# Patient Record
Sex: MALE | Race: WHITE | NOT HISPANIC OR LATINO | Employment: UNEMPLOYED | URBAN - METROPOLITAN AREA
[De-identification: names, ages, dates, MRNs, and addresses within clinical notes are randomized per-mention and may not be internally consistent; named-entity substitution may affect disease eponyms.]

---

## 2020-06-14 ENCOUNTER — APPOINTMENT (OUTPATIENT)
Dept: RADIOLOGY | Facility: MEDICAL CENTER | Age: 25
End: 2020-06-14
Attending: EMERGENCY MEDICINE
Payer: COMMERCIAL

## 2020-06-14 ENCOUNTER — HOSPITAL ENCOUNTER (EMERGENCY)
Facility: MEDICAL CENTER | Age: 25
End: 2020-06-14
Attending: EMERGENCY MEDICINE
Payer: COMMERCIAL

## 2020-06-14 VITALS
DIASTOLIC BLOOD PRESSURE: 62 MMHG | HEART RATE: 66 BPM | WEIGHT: 155 LBS | BODY MASS INDEX: 20.54 KG/M2 | OXYGEN SATURATION: 99 % | TEMPERATURE: 98.1 F | HEIGHT: 73 IN | SYSTOLIC BLOOD PRESSURE: 119 MMHG | RESPIRATION RATE: 17 BRPM

## 2020-06-14 DIAGNOSIS — B34.9 VIRAL SYNDROME: ICD-10-CM

## 2020-06-14 LAB
CLARITY CSF: CLEAR
COLOR CSF: COLORLESS
COLOR SPUN CSF: COLORLESS
COVID ORDER STATUS COVID19: NORMAL
CSF COMMENTS 1658: NORMAL
GLUCOSE CSF-MCNC: 57 MG/DL (ref 40–80)
GRAM STN SPEC: NORMAL
LYMPHOCYTES NFR CSF: 67 %
MONONUC CELLS NFR CSF: 33 %
PROT CSF-MCNC: 29 MG/DL (ref 15–45)
RBC # CSF: <1 CELLS/UL
S PYO DNA SPEC NAA+PROBE: NOT DETECTED
SIGNIFICANT IND 70042: NORMAL
SITE SITE: NORMAL
SOURCE SOURCE: NORMAL
SPECIMEN VOL CSF: 4.5 ML
TUBE # CSF: 4
TUBE # CSF: 4
WBC # CSF: 1 CELLS/UL (ref 0–10)

## 2020-06-14 PROCEDURE — 99285 EMERGENCY DEPT VISIT HI MDM: CPT

## 2020-06-14 PROCEDURE — 71045 X-RAY EXAM CHEST 1 VIEW: CPT

## 2020-06-14 PROCEDURE — 62270 DX LMBR SPI PNXR: CPT

## 2020-06-14 PROCEDURE — C9803 HOPD COVID-19 SPEC COLLECT: HCPCS | Performed by: EMERGENCY MEDICINE

## 2020-06-14 PROCEDURE — 82945 GLUCOSE OTHER FLUID: CPT

## 2020-06-14 PROCEDURE — 87205 SMEAR GRAM STAIN: CPT

## 2020-06-14 PROCEDURE — 87651 STREP A DNA AMP PROBE: CPT

## 2020-06-14 PROCEDURE — 700111 HCHG RX REV CODE 636 W/ 250 OVERRIDE (IP): Performed by: EMERGENCY MEDICINE

## 2020-06-14 PROCEDURE — 96374 THER/PROPH/DIAG INJ IV PUSH: CPT

## 2020-06-14 PROCEDURE — 89051 BODY FLUID CELL COUNT: CPT

## 2020-06-14 PROCEDURE — 84157 ASSAY OF PROTEIN OTHER: CPT

## 2020-06-14 PROCEDURE — 87070 CULTURE OTHR SPECIMN AEROBIC: CPT

## 2020-06-14 RX ORDER — ONDANSETRON 2 MG/ML
4 INJECTION INTRAMUSCULAR; INTRAVENOUS ONCE
Status: COMPLETED | OUTPATIENT
Start: 2020-06-14 | End: 2020-06-14

## 2020-06-14 RX ORDER — ONDANSETRON 4 MG/1
4 TABLET, ORALLY DISINTEGRATING ORAL EVERY 8 HOURS PRN
Qty: 10 TAB | Refills: 0 | Status: SHIPPED | OUTPATIENT
Start: 2020-06-14 | End: 2024-01-23

## 2020-06-14 RX ADMIN — ONDANSETRON 4 MG: 2 INJECTION INTRAMUSCULAR; INTRAVENOUS at 13:39

## 2020-06-14 NOTE — ED PROVIDER NOTES
ED Provider Note    Scribed for Iggy Cisse M.D. by Harpal Sharma. 6/14/2020, 1:19 PM.    Primary care provider: No primary care provider noted.  Means of arrival: walk-in  History obtained from: patient  History limited by: none    CHIEF COMPLAINT  Chief Complaint   Patient presents with   • Headache     Walk in from home for headache and chills   • Chills       HPI  Jay Jay Jarrell is a 25 y.o. male who presents to the Emergency Department with complaints of a headache and fever for the past 3 days. He reports associated sore throat and vomiting that began today. He denies any cough, diarrhea, rash, photophobia, shortness of breath, chest pain, or abdominal pain. No alleviating or exacerbating factors noted. He states he may have been exposed to someone with a cough, but he has not had any exposure to known COVID-19 patients.     PPE Note: I personally donned full PPE for all patient encounters during this visit, including being clean-shaven with an N95 respirator mask, gloves, and eye protection. Scribe remained outside the patient's room and did not have any contact with the patient for the duration of patient encounter.       REVIEW OF SYSTEMS  Pertinent positives include headache, sore throat, vomiting. Pertinent negatives include no cough, diarrhea, rash, photophobia, shortness of breath, chest pain, or abdominal pain.  All other systems reviewed and negative.    PAST MEDICAL HISTORY  None noted when reviewed.     SURGICAL HISTORY  patient denies any surgical history    SOCIAL HISTORY  Social History     Tobacco Use   • Smoking status: None noted when reviewed.    Substance Use Topics   • Alcohol use: None noted when reviewed.    • Drug use: None noted when reviewed.         FAMILY HISTORY  History reviewed. No pertinent family history.    CURRENT MEDICATIONS  No current outpatient medications     ALLERGIES  No Known Allergies    PHYSICAL EXAM  VITAL SIGNS: /73   Pulse 69   Temp 36.7 °C (98.1 °F)  "(Oral)   Resp 18   Ht 1.854 m (6' 1\")   Wt 70.3 kg (155 lb)   SpO2 97%   BMI 20.45 kg/m²     Constitutional: Well developed, Well nourished, No acute distress, Non-toxic appearance.   HENT: Normocephalic, Atraumatic, TMs normal, mucous membranes moist, mild pharyngeal erythema.  Eyes: nonicteric  Neck: Supple, no neck stiffness.  Lymphatic: No lymphadenopathy noted.   Cardiovascular: Regular rate and rhythm, no gallops rubs or murmurs  Lungs: Clear bilaterally   Abdomen: Bowel sounds normal, Soft, No tenderness  Skin: Warm, Dry, no rash  Back: No tenderness, No CVA tenderness.   Genitalia: Deferred  Rectal: Deferred  Extremities: No edema  Neurologic: Alert, appropriate, follows commands, moving all extremities, normal speech   Psychiatric: Affect normal    DIAGNOSTIC STUDIES / PROCEDURES    LABS  Results for orders placed or performed during the hospital encounter of 06/14/20   COVID/SARS CoV-2 PCR    Specimen: Nasopharyngeal; Respirate   Result Value Ref Range    COVID Order Status Received    Group A Strep by PCR   Result Value Ref Range    Group A Strep by PCR Not Detected Not Detected   2019-nCoV RNA ([facility])   Result Value Ref Range    2019-nCoV Source NP Swab       All labs reviewed by me.     RADIOLOGY  DX-CHEST-PORTABLE (1 VIEW)   Final Result      No acute cardiopulmonary process is seen.        The radiologist's interpretation of all radiological studies have been reviewed by me.    Lumbar Puncture Procedure Note    Indication: Suspected meningitis and to obtain spinal fluid for diagnostic testing    Consent: The patient was counseled regarding the procedure, it's indications, risks, potential complications and alternatives and any questions were answered. Consent was obtained.    Procedure: The patient was placed in the sitting position and the appropriate landmarks were identified. The area was prepped and draped in the usual sterile fashion. Anesthesia was obtained using 1 cc of 1% Lidocaine " without epinephrine. A spinal needle was inserted at the L4- L5 level with the stylet in place until spinal fluid was returned. Opening pressure was not measured. At this point 4.0 cc of clear cerebral spinal fluid was obtained and sent for appropriate testing. The stylet was then replaced and the needle was withdrawn. A sterile dressing was placed over the site and the patient was placed in the supine position.    The patient tolerated the procedure well.    Complications: None      COURSE & MEDICAL DECISION MAKING  Nursing notes, VS, PMSFHx reviewed in chart.     1:19 PM Patient seen and examined at bedside. Ordered for strep testing, COVID testing, and CXR to evaluate. Patient was treated with Zofran 4 mg for his symptoms.     2:14 PM - Patient's strep test and CXR returned as negative. Reevaluated patient at bedside and discussed his results. Based on the patient's clinical presentation, I advised completing a lumbar puncture to further evaluate. Patient verbalizes understanding and consent.     2:33 PM - Lumbar puncture was completed as noted above. Patient tolerated the procedure well.     Decision Making:  This is a 25 y.o. year old male who presents with headache and fever with a temperature of 100.4 in triage.  The patient after questioning did note a slight sore throat.  He was swabbed for strep which was negative.  Chest x-ray was clear.  The patient did not note any sinus pain or pressure.  He was consented for spinal tap to exclude meningitis.  This was performed in his results look normal.  He was also swabbed for COVID and that is currently pending.  He is going to self quarantine at home until that is back.    DISPOSITION:  Patient will be discharged home in stable condition.    FOLLOW UP:  [address] [address] [phone]  Schedule an appointment as soon as possible for a visit today        OUTPATIENT MEDICATIONS:  New Prescriptions     ONDANSETRON (ZOFRAN ODT) 4 MG TABLET DISPERSIBLE    Take 1 Tab by mouth every 8 hours as needed for Nausea.        FINAL IMPRESSION  1. Viral syndrome          Harpal OLGUIN (Scribe), am scribing for, and in the presence of, Iggy Cisse M.D..    Electronically signed by: Harpal Sharma (Scribe), 6/14/2020    IIggy M.D. personally performed the services described in this documentation, as scribed by Harpal Sharma in my presence, and it is both accurate and complete.    The note accurately reflects work and decisions made by me.  Iggy Cisse M.D.  6/14/2020  4:03 PM

## 2020-06-14 NOTE — ED TRIAGE NOTES
"Chief Complaint   Patient presents with   • Headache     Walk in from home for headache and chills   • Chills     /73   Pulse 69   Temp 36.7 °C (98.1 °F) (Oral)   Resp 18   Ht 1.854 m (6' 1\")   Wt 70.3 kg (155 lb)   SpO2 97%   BMI 20.45 kg/m²     "

## 2020-06-14 NOTE — DISCHARGE INSTRUCTIONS
A test has been sent for COVID-19.  Given your symptoms, you could have this.  You will be called by the health department with results.  Until that time you need to self quarantine.  Return for trouble breathing persistent vomiting or other concerns.

## 2020-06-17 LAB
BACTERIA CSF CULT: NORMAL
GRAM STN SPEC: NORMAL
SIGNIFICANT IND 70042: NORMAL
SITE SITE: NORMAL
SOURCE SOURCE: NORMAL

## 2020-06-19 LAB
SARS-COV-2 RNA RESP QL NAA+PROBE: NOT DETECTED
SPECIMEN SOURCE: NORMAL

## 2020-06-20 NOTE — ED NOTES
COVID-19 Test Follow Up  06/20/20      Patient tested negative for COVID-19. I have informed the patient of the result by phone. Encouraged to stay at home until no fever for 72 hours without the use of fever reducing medications and symptoms improving. Informed there is no need to further self-isolate for 14 days for COVID-19 unless otherwise directed by the Health Dept.     They are advised to return to the ER for worsening symptoms including difficulty breathing, ongoing fever, weakness or chest pain.    Muriel Trevino, PharmD

## 2024-01-23 ENCOUNTER — OFFICE VISIT (OUTPATIENT)
Dept: MEDICAL GROUP | Facility: MEDICAL CENTER | Age: 29
End: 2024-01-23
Payer: COMMERCIAL

## 2024-01-23 VITALS
RESPIRATION RATE: 16 BRPM | HEIGHT: 72 IN | HEART RATE: 74 BPM | TEMPERATURE: 97.3 F | DIASTOLIC BLOOD PRESSURE: 70 MMHG | WEIGHT: 172.4 LBS | SYSTOLIC BLOOD PRESSURE: 110 MMHG | BODY MASS INDEX: 23.35 KG/M2 | OXYGEN SATURATION: 96 %

## 2024-01-23 DIAGNOSIS — M25.512 CHRONIC PAIN OF BOTH SHOULDERS: ICD-10-CM

## 2024-01-23 DIAGNOSIS — R10.32 LEFT LOWER QUADRANT ABDOMINAL PAIN: ICD-10-CM

## 2024-01-23 DIAGNOSIS — M25.511 CHRONIC PAIN OF BOTH SHOULDERS: ICD-10-CM

## 2024-01-23 DIAGNOSIS — Z23 NEED FOR HPV VACCINATION: ICD-10-CM

## 2024-01-23 DIAGNOSIS — Z00.00 PREVENTATIVE HEALTH CARE: ICD-10-CM

## 2024-01-23 DIAGNOSIS — G89.29 CHRONIC PAIN OF BOTH SHOULDERS: ICD-10-CM

## 2024-01-23 PROBLEM — R10.13 DYSPEPSIA: Status: ACTIVE | Noted: 2024-01-23

## 2024-01-23 PROCEDURE — 90651 9VHPV VACCINE 2/3 DOSE IM: CPT | Performed by: PHYSICIAN ASSISTANT

## 2024-01-23 PROCEDURE — 3078F DIAST BP <80 MM HG: CPT | Performed by: PHYSICIAN ASSISTANT

## 2024-01-23 PROCEDURE — 99204 OFFICE O/P NEW MOD 45 MIN: CPT | Mod: 25 | Performed by: PHYSICIAN ASSISTANT

## 2024-01-23 PROCEDURE — 3074F SYST BP LT 130 MM HG: CPT | Performed by: PHYSICIAN ASSISTANT

## 2024-01-23 PROCEDURE — 90471 IMMUNIZATION ADMIN: CPT | Performed by: PHYSICIAN ASSISTANT

## 2024-01-23 ASSESSMENT — PATIENT HEALTH QUESTIONNAIRE - PHQ9: CLINICAL INTERPRETATION OF PHQ2 SCORE: 0

## 2024-01-23 NOTE — PROGRESS NOTES
Subjective:   Jay Jay Jarrell is a 28 y.o. male here today for intermittent, left lower quad abdominal pain and bilateral shoulder pain.  Also to establish care.    Left lower quadrant abdominal pain  This is a pleasant 28-year-old male here today to establish care.  Has had a couple episodes where he had abdominal pain with other associated symptoms.  Once was after a concert.  Only ate [employer].  Most recently it was after eating try tip which was in his freezer.  He will have some intermittent left lower quadrant abdominal pain.  Some issues with his bowel movements.  Denies any fevers.  No nausea or vomiting.  No ivelisse reflux symptoms.  Denies any rectal bleeding.    Chronic pain of both shoulders  Also has injured both of his shoulders.  Did have x-rays performed last year that were negative.  Still has some issues with range of motion.  There is a occasional popping sound when his shoulders are rotating.       Current medicines (including changes today)  No current outpatient medications on file.     No current facility-administered medications for this visit.     He  has no past medical history on file.    Social History and Family History were reviewed and updated.    ROS   No chest pain, no shortness of breath, no abdominal pain and all other systems were reviewed and are negative.       Objective:     /70 (BP Location: Left arm, Patient Position: Sitting, BP Cuff Size: Adult)   Pulse 74   Temp 36.3 °C (97.3 °F) (Temporal)   Resp 16   Ht 1.829 m (6')   Wt 78.2 kg (172 lb 6.4 oz)   SpO2 96%  Body mass index is 23.38 kg/m².   Physical Exam:  Constitutional: Alert, no distress.  Skin: Warm, dry, good turgor, no rashes in visible areas.  Eye: Equal, round and reactive, conjunctiva clear, lids normal.  ENMT: Lips without lesions, good dentition, oropharynx clear.  Neck: Trachea midline, no masses.   Psych: Alert and oriented x3, normal affect and mood.        Assessment and Plan:   The following  treatment plan was discussed    1. Left lower quadrant abdominal pain  Chronic condition.  Has had couple location of several weeks of symptoms.  Symptoms could be secondary to IBS.  Advised to take a dietary fiber supplement.  If symptoms continue he may contact me through [facility] for referral to see GI.  This point we will continue to watch to see what may trigger his symptoms.    2. Chronic pain of both shoulders  Chronic condition.  Stable.  Referred to orthopedics to establish care.  Reviewed x-rays already in the system.  Discussed likely he will need PT.  If he does need PT to contact me for referral to [facility].  - Referral to Orthopedics    3. Preventative health care  Ordered labs.  Fast 8 hours.  He will be contacted with the results.  - CBC WITH DIFFERENTIAL; Future  - Comp Metabolic Panel; Future  - Lipid Profile; Future  - HEMOGLOBIN A1C; Future  - TSH WITH REFLEX TO FT4; Future    4. Need for HPV vaccination  HPV #3 administered.  No complaints.  - Gardasil 9         Followup: Return in about 1 year (around 1/23/2025), or if symptoms worsen or fail to improve.    Please note that this dictation was created using voice recognition software. I have made every reasonable attempt to correct obvious errors, but I expect that there are errors of grammar and possibly content that I did not discover before finalizing the note.

## 2024-01-23 NOTE — ASSESSMENT & PLAN NOTE
Also has injured both of his shoulders.  Did have x-rays performed last year that were negative.  Still has some issues with range of motion.  There is a occasional popping sound when his shoulders are rotating.

## 2024-01-23 NOTE — ASSESSMENT & PLAN NOTE
This is a pleasant 28-year-old male here today to establish care.  Has had a couple episodes where he had abdominal pain with other associated symptoms.  Once was after a concert.  Only ate [employer].  Most recently it was after eating try tip which was in his freezer.  He will have some intermittent left lower quadrant abdominal pain.  Some issues with his bowel movements.  Denies any fevers.  No nausea or vomiting.  No ivelisse reflux symptoms.  Denies any rectal bleeding.

## 2024-01-30 ENCOUNTER — HOSPITAL ENCOUNTER (OUTPATIENT)
Dept: LAB | Facility: MEDICAL CENTER | Age: 29
End: 2024-01-30
Attending: PHYSICIAN ASSISTANT
Payer: COMMERCIAL

## 2024-01-30 DIAGNOSIS — Z00.00 PREVENTATIVE HEALTH CARE: ICD-10-CM

## 2024-01-30 LAB
ALBUMIN SERPL BCP-MCNC: 4.7 G/DL (ref 3.2–4.9)
ALBUMIN/GLOB SERPL: 1.7 G/DL
ALP SERPL-CCNC: 61 U/L (ref 30–99)
ALT SERPL-CCNC: 19 U/L (ref 2–50)
ANION GAP SERPL CALC-SCNC: 10 MMOL/L (ref 7–16)
AST SERPL-CCNC: 19 U/L (ref 12–45)
BASOPHILS # BLD AUTO: 0.6 % (ref 0–1.8)
BASOPHILS # BLD: 0.03 K/UL (ref 0–0.12)
BILIRUB SERPL-MCNC: 0.8 MG/DL (ref 0.1–1.5)
BUN SERPL-MCNC: 12 MG/DL (ref 8–22)
CALCIUM ALBUM COR SERPL-MCNC: 9 MG/DL (ref 8.5–10.5)
CALCIUM SERPL-MCNC: 9.6 MG/DL (ref 8.4–10.2)
CHLORIDE SERPL-SCNC: 102 MMOL/L (ref 96–112)
CHOLEST SERPL-MCNC: 178 MG/DL (ref 100–199)
CO2 SERPL-SCNC: 30 MMOL/L (ref 20–33)
CREAT SERPL-MCNC: 0.88 MG/DL (ref 0.5–1.4)
EOSINOPHIL # BLD AUTO: 0.19 K/UL (ref 0–0.51)
EOSINOPHIL NFR BLD: 3.9 % (ref 0–6.9)
ERYTHROCYTE [DISTWIDTH] IN BLOOD BY AUTOMATED COUNT: 39.2 FL (ref 35.9–50)
EST. AVERAGE GLUCOSE BLD GHB EST-MCNC: 91 MG/DL
FASTING STATUS PATIENT QL REPORTED: NORMAL
GFR SERPLBLD CREATININE-BSD FMLA CKD-EPI: 121 ML/MIN/1.73 M 2
GLOBULIN SER CALC-MCNC: 2.7 G/DL (ref 1.9–3.5)
GLUCOSE SERPL-MCNC: 84 MG/DL (ref 65–99)
HBA1C MFR BLD: 4.8 % (ref 4–5.6)
HCT VFR BLD AUTO: 48.9 % (ref 42–52)
HDLC SERPL-MCNC: 44 MG/DL
HGB BLD-MCNC: 16.7 G/DL (ref 14–18)
IMM GRANULOCYTES # BLD AUTO: 0.01 K/UL (ref 0–0.11)
IMM GRANULOCYTES NFR BLD AUTO: 0.2 % (ref 0–0.9)
LDLC SERPL CALC-MCNC: 112 MG/DL
LYMPHOCYTES # BLD AUTO: 1.66 K/UL (ref 1–4.8)
LYMPHOCYTES NFR BLD: 33.7 % (ref 22–41)
MCH RBC QN AUTO: 30.8 PG (ref 27–33)
MCHC RBC AUTO-ENTMCNC: 34.2 G/DL (ref 32.3–36.5)
MCV RBC AUTO: 90.2 FL (ref 81.4–97.8)
MONOCYTES # BLD AUTO: 0.59 K/UL (ref 0–0.85)
MONOCYTES NFR BLD AUTO: 12 % (ref 0–13.4)
NEUTROPHILS # BLD AUTO: 2.45 K/UL (ref 1.82–7.42)
NEUTROPHILS NFR BLD: 49.6 % (ref 44–72)
NRBC # BLD AUTO: 0 K/UL
NRBC BLD-RTO: 0 /100 WBC (ref 0–0.2)
PLATELET # BLD AUTO: 227 K/UL (ref 164–446)
PMV BLD AUTO: 10.4 FL (ref 9–12.9)
POTASSIUM SERPL-SCNC: 4.5 MMOL/L (ref 3.6–5.5)
PROT SERPL-MCNC: 7.4 G/DL (ref 6–8.2)
RBC # BLD AUTO: 5.42 M/UL (ref 4.7–6.1)
SODIUM SERPL-SCNC: 142 MMOL/L (ref 135–145)
TRIGL SERPL-MCNC: 110 MG/DL (ref 0–149)
TSH SERPL DL<=0.005 MIU/L-ACNC: 2.57 UIU/ML (ref 0.38–5.33)
WBC # BLD AUTO: 4.9 K/UL (ref 4.8–10.8)

## 2024-01-30 PROCEDURE — 36415 COLL VENOUS BLD VENIPUNCTURE: CPT

## 2024-01-30 PROCEDURE — 85025 COMPLETE CBC W/AUTO DIFF WBC: CPT

## 2024-01-30 PROCEDURE — 84443 ASSAY THYROID STIM HORMONE: CPT

## 2024-01-30 PROCEDURE — 80061 LIPID PANEL: CPT

## 2024-01-30 PROCEDURE — 83036 HEMOGLOBIN GLYCOSYLATED A1C: CPT

## 2024-01-30 PROCEDURE — 80053 COMPREHEN METABOLIC PANEL: CPT

## 2025-01-24 ENCOUNTER — APPOINTMENT (OUTPATIENT)
Dept: MEDICAL GROUP | Facility: MEDICAL CENTER | Age: 30
End: 2025-01-24
Payer: COMMERCIAL

## 2025-03-21 ENCOUNTER — APPOINTMENT (OUTPATIENT)
Dept: MEDICAL GROUP | Facility: MEDICAL CENTER | Age: 30
End: 2025-03-21
Payer: COMMERCIAL

## 2025-05-05 ENCOUNTER — APPOINTMENT (OUTPATIENT)
Dept: MEDICAL GROUP | Facility: MEDICAL CENTER | Age: 30
End: 2025-05-05
Payer: COMMERCIAL

## 2025-05-05 VITALS
BODY MASS INDEX: 21.56 KG/M2 | WEIGHT: 162.7 LBS | DIASTOLIC BLOOD PRESSURE: 56 MMHG | SYSTOLIC BLOOD PRESSURE: 114 MMHG | OXYGEN SATURATION: 96 % | HEIGHT: 73 IN | HEART RATE: 66 BPM | TEMPERATURE: 97.9 F

## 2025-05-05 DIAGNOSIS — M25.512 CHRONIC PAIN OF BOTH SHOULDERS: ICD-10-CM

## 2025-05-05 DIAGNOSIS — Z00.00 ANNUAL PHYSICAL EXAM: ICD-10-CM

## 2025-05-05 DIAGNOSIS — G89.29 CHRONIC PAIN OF BOTH SHOULDERS: ICD-10-CM

## 2025-05-05 DIAGNOSIS — M25.511 CHRONIC PAIN OF BOTH SHOULDERS: ICD-10-CM

## 2025-05-05 DIAGNOSIS — R79.89 LOW VITAMIN D LEVEL: ICD-10-CM

## 2025-05-05 DIAGNOSIS — K59.04 CHRONIC IDIOPATHIC CONSTIPATION: ICD-10-CM

## 2025-05-05 PROBLEM — R10.32 LEFT LOWER QUADRANT ABDOMINAL PAIN: Status: RESOLVED | Noted: 2024-01-23 | Resolved: 2025-05-05

## 2025-05-05 PROBLEM — K59.00 CONSTIPATION: Status: ACTIVE | Noted: 2025-05-05

## 2025-05-05 PROCEDURE — 3078F DIAST BP <80 MM HG: CPT | Performed by: PHYSICIAN ASSISTANT

## 2025-05-05 PROCEDURE — 99385 PREV VISIT NEW AGE 18-39: CPT | Performed by: PHYSICIAN ASSISTANT

## 2025-05-05 PROCEDURE — 3074F SYST BP LT 130 MM HG: CPT | Performed by: PHYSICIAN ASSISTANT

## 2025-05-05 ASSESSMENT — PATIENT HEALTH QUESTIONNAIRE - PHQ9: CLINICAL INTERPRETATION OF PHQ2 SCORE: 0

## 2025-05-05 ASSESSMENT — FIBROSIS 4 INDEX: FIB4 SCORE: 0.54

## 2025-05-05 NOTE — PROGRESS NOTES
"Subjective:     History of Present Illness  The patient presents for an annual checkup.    He reports no new health concerns since his last visit. He has been contemplating a consultation with a gastroenterologist or nutritionist due to persistent abdominal discomfort, which he describes as a generalized sensation rather than localized to the left lower quadrant. He does not experience heartburn or reflux but admits to occasional upset stomach and constipation. He experiences flare-ups a couple of times a month. He has been managing these symptoms with fiber gummies which have provided some relief. He has not yet tried MiraLAX but is considering it.    He also reports chronic shoulder pain, which was exacerbated last week when he picked up a guitar. He has not yet sought follow-up care for this issue. Previous x-rays have not revealed any abnormalities.      Current medicines (including changes today)  No current outpatient medications on file.     No current facility-administered medications for this visit.     He  has no past medical history on file.    ROS   No chest pain, no shortness of breath, no abdominal pain  Positive ROS as per HPI.  All other systems reviewed and are negative.     Objective:     /56 (BP Location: Right arm, Patient Position: Sitting, BP Cuff Size: Adult)   Pulse 66   Temp 36.6 °C (97.9 °F) (Temporal)   Ht 1.854 m (6' 1\")   Wt 73.8 kg (162 lb 11.2 oz)   SpO2 96%  Body mass index is 21.47 kg/m².   Physical Exam    Constitutional: Alert, no distress.  Skin: Warm, dry, good turgor, no rashes in visible areas.  Eye: Equal, round and reactive, conjunctiva clear, lids normal.  ENMT: Lips without lesions, good dentition, oropharynx clear.  Neck: Trachea midline, no masses, no thyromegaly.   Psych: Alert and oriented x3, normal affect and mood.      Results  Labs   - A1c: 01/2025, 4.8   - Thyroid level: 01/2025, Normal        Assessment and Plan:   The following treatment plan was " discussed    Assessment & Plan  1.  Annual physical exam.  Generally healthy male.  - Laboratory results from 01/2025 were excellent, with an A1c of 4.8 and normal thyroid levels.  - No additional labs are deemed necessary at this time.  - Laboratory tests will be ordered for his next visit in 01/2026, including a complete blood count, liver and kidney function tests, cholesterol and lipid panel, A1c, and vitamin D level.  - Advised to fast for 8 hours prior to these tests.    2. Constipation.  Chronic, intermittent condition.  - Reports experiencing constipation and occasional upset stomach. He has been using fiber gummies and a generic brand of Metamucil, which provide some relief.  - Advised to try MiraLAX, starting with half a capful (approximately 8.5 g) and adjusting the dose as needed.  - Encouraged to increase dietary fiber intake by consuming more vegetables and whole grains while reducing meat and pasta consumption.  - If symptoms persist, he should contact the office.    3. Chronic shoulder pain.  Chronic, intermittent condition.  - Reports chronic shoulder pain, exacerbated by playing the guitar.  - Advised to adjust his posture and consider using a shoulder strap to alleviate discomfort.  - Referral to [facility] has been provided for further evaluation and potential physical therapy.  - Recommended to follow up with physical therapy after evaluation.        Follow-up  - Follow up in 1 year.      ORDERS:  1. Annual physical exam    - CBC WITH DIFFERENTIAL; Future  - Comp Metabolic Panel; Future  - Lipid Profile; Future  - HEMOGLOBIN A1C; Future  - VITAMIN D,25 HYDROXY (DEFICIENCY); Future    2. Chronic idiopathic constipation      3. Low vitamin D level    - VITAMIN D,25 HYDROXY (DEFICIENCY); Future    4. Chronic pain of both shoulders          Please note that this dictation was created using voice recognition software. I have made every reasonable attempt to correct obvious errors, but I  expect that there are errors of grammar and possibly content that I did not discover before finalizing the note.      Attestation      Verbal consent was acquired by the patient to use [employer] ambient listening note generation during this visit Yes

## 2025-06-24 DIAGNOSIS — K59.04 CHRONIC IDIOPATHIC CONSTIPATION: Primary | ICD-10-CM

## 2025-06-30 NOTE — Clinical Note
REFERRAL APPROVAL NOTICE         Sent on June 30, 2025                   Antelmo Jarrell  [address]  [facility] [address]                   Dear Mr. Jarrell,    After a careful review of the medical information and benefit coverage, [facility] has processed your referral. See below for additional details.    If applicable, you must be actively enrolled with your insurance for coverage of the authorized service. If you have any questions regarding your coverage, please contact your insurance directly.    REFERRAL INFORMATION   Referral #:  [id number]  Referred-To Provider    Referred-By Provider:  Gastroenterology    Rian Ruiz P.A.-C.   [facility]      [address]  [address]  [place] [address]  [phone] [address] [place] [id number] [phone]    Referral Start Date:  06/24/2025  Referral End Date:   06/24/2026             SCHEDULING  If you do not already have an appointment, please call [phone] to make an appointment.     MORE INFORMATION  If you do not already have a [employer] account, sign up at: [email or web address]  You can access your medical information, make appointments, see lab results, billing information, and more.  If you have questions regarding this referral, please contact  the [facility] Referrals department at:             [phone]. Monday - Friday 8:00AM - 5:00PM.     Sincerely,    [facility]